# Patient Record
Sex: FEMALE | Race: WHITE | NOT HISPANIC OR LATINO | Employment: FULL TIME | URBAN - METROPOLITAN AREA
[De-identification: names, ages, dates, MRNs, and addresses within clinical notes are randomized per-mention and may not be internally consistent; named-entity substitution may affect disease eponyms.]

---

## 2022-12-23 ENCOUNTER — HOSPITAL ENCOUNTER (EMERGENCY)
Facility: HOSPITAL | Age: 57
Discharge: HOME | End: 2022-12-23
Attending: EMERGENCY MEDICINE

## 2022-12-23 ENCOUNTER — APPOINTMENT (EMERGENCY)
Dept: CT IMAGING | Facility: HOSPITAL | Age: 57
End: 2022-12-23

## 2022-12-23 VITALS
RESPIRATION RATE: 22 BRPM | HEIGHT: 68 IN | SYSTOLIC BLOOD PRESSURE: 102 MMHG | DIASTOLIC BLOOD PRESSURE: 72 MMHG | OXYGEN SATURATION: 100 % | HEART RATE: 79 BPM | BODY MASS INDEX: 21.22 KG/M2 | TEMPERATURE: 98.1 F | WEIGHT: 140 LBS

## 2022-12-23 DIAGNOSIS — R20.2 PARESTHESIAS: ICD-10-CM

## 2022-12-23 DIAGNOSIS — G43.909 MIGRAINE: Primary | ICD-10-CM

## 2022-12-23 LAB
ALBUMIN SERPL BCP-MCNC: 3.8 G/DL (ref 3.5–5)
ALP SERPL-CCNC: 68 U/L (ref 46–116)
ALT SERPL W P-5'-P-CCNC: 40 U/L (ref 12–78)
ANION GAP SERPL CALCULATED.3IONS-SCNC: 8 MMOL/L (ref 4–13)
APTT PPP: 29 SECONDS (ref 23–37)
AST SERPL W P-5'-P-CCNC: 26 U/L (ref 5–45)
ATRIAL RATE: 69 BPM
BASOPHILS # BLD AUTO: 0.08 THOUSANDS/ÂΜL (ref 0–0.1)
BASOPHILS NFR BLD AUTO: 1 % (ref 0–1)
BILIRUB SERPL-MCNC: 0.41 MG/DL (ref 0.2–1)
BUN SERPL-MCNC: 14 MG/DL (ref 5–25)
CALCIUM SERPL-MCNC: 9.3 MG/DL (ref 8.3–10.1)
CARDIAC TROPONIN I PNL SERPL HS: <2 NG/L
CARDIAC TROPONIN I PNL SERPL HS: <2 NG/L
CHLORIDE SERPL-SCNC: 106 MMOL/L (ref 96–108)
CO2 SERPL-SCNC: 27 MMOL/L (ref 21–32)
CREAT SERPL-MCNC: 0.78 MG/DL (ref 0.6–1.3)
EOSINOPHIL # BLD AUTO: 0.24 THOUSAND/ÂΜL (ref 0–0.61)
EOSINOPHIL NFR BLD AUTO: 4 % (ref 0–6)
ERYTHROCYTE [DISTWIDTH] IN BLOOD BY AUTOMATED COUNT: 12.2 % (ref 11.6–15.1)
GFR SERPL CREATININE-BSD FRML MDRD: 85 ML/MIN/1.73SQ M
GLUCOSE SERPL-MCNC: 92 MG/DL (ref 65–140)
HCT VFR BLD AUTO: 41.5 % (ref 34.8–46.1)
HGB BLD-MCNC: 13.9 G/DL (ref 11.5–15.4)
IMM GRANULOCYTES # BLD AUTO: 0.01 THOUSAND/UL (ref 0–0.2)
IMM GRANULOCYTES NFR BLD AUTO: 0 % (ref 0–2)
INR PPP: 0.92 (ref 0.84–1.19)
LYMPHOCYTES # BLD AUTO: 1.81 THOUSANDS/ÂΜL (ref 0.6–4.47)
LYMPHOCYTES NFR BLD AUTO: 30 % (ref 14–44)
MCH RBC QN AUTO: 32.2 PG (ref 26.8–34.3)
MCHC RBC AUTO-ENTMCNC: 33.5 G/DL (ref 31.4–37.4)
MCV RBC AUTO: 96 FL (ref 82–98)
MONOCYTES # BLD AUTO: 0.48 THOUSAND/ÂΜL (ref 0.17–1.22)
MONOCYTES NFR BLD AUTO: 8 % (ref 4–12)
NEUTROPHILS # BLD AUTO: 3.41 THOUSANDS/ÂΜL (ref 1.85–7.62)
NEUTS SEG NFR BLD AUTO: 57 % (ref 43–75)
NRBC BLD AUTO-RTO: 0 /100 WBCS
P AXIS: 74 DEGREES
PLATELET # BLD AUTO: 315 THOUSANDS/UL (ref 149–390)
PMV BLD AUTO: 10.1 FL (ref 8.9–12.7)
POTASSIUM SERPL-SCNC: 5.1 MMOL/L (ref 3.5–5.3)
PR INTERVAL: 150 MS
PROT SERPL-MCNC: 7.4 G/DL (ref 6.4–8.4)
PROTHROMBIN TIME: 12.2 SECONDS (ref 11.6–14.5)
QRS AXIS: 80 DEGREES
QRSD INTERVAL: 78 MS
QT INTERVAL: 410 MS
QTC INTERVAL: 439 MS
RBC # BLD AUTO: 4.32 MILLION/UL (ref 3.81–5.12)
SODIUM SERPL-SCNC: 141 MMOL/L (ref 135–147)
T WAVE AXIS: 75 DEGREES
VENTRICULAR RATE: 69 BPM
WBC # BLD AUTO: 6.03 THOUSAND/UL (ref 4.31–10.16)

## 2022-12-23 RX ORDER — METOCLOPRAMIDE HYDROCHLORIDE 5 MG/ML
10 INJECTION INTRAMUSCULAR; INTRAVENOUS ONCE
Status: COMPLETED | OUTPATIENT
Start: 2022-12-23 | End: 2022-12-23

## 2022-12-23 RX ORDER — DIPHENHYDRAMINE HYDROCHLORIDE 50 MG/ML
25 INJECTION INTRAMUSCULAR; INTRAVENOUS ONCE
Status: COMPLETED | OUTPATIENT
Start: 2022-12-23 | End: 2022-12-23

## 2022-12-23 RX ADMIN — DIPHENHYDRAMINE HYDROCHLORIDE 25 MG: 50 INJECTION, SOLUTION INTRAMUSCULAR; INTRAVENOUS at 11:12

## 2022-12-23 RX ADMIN — SODIUM CHLORIDE 1000 ML: 0.9 INJECTION, SOLUTION INTRAVENOUS at 11:09

## 2022-12-23 RX ADMIN — METOCLOPRAMIDE 10 MG: 5 INJECTION, SOLUTION INTRAMUSCULAR; INTRAVENOUS at 11:14

## 2022-12-23 RX ADMIN — IOHEXOL 85 ML: 350 INJECTION, SOLUTION INTRAVENOUS at 12:30

## 2022-12-23 NOTE — ED NOTES
[facility] arranging transport for pt to return to their facility        Dilma Dunaway RN  12/23/22 8906
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
## 2022-12-25 ENCOUNTER — HOSPITAL ENCOUNTER (EMERGENCY)
Facility: HOSPITAL | Age: 57
Discharge: HOME | End: 2022-12-25
Attending: EMERGENCY MEDICINE

## 2022-12-25 VITALS
HEART RATE: 76 BPM | TEMPERATURE: 97.5 F | OXYGEN SATURATION: 100 % | SYSTOLIC BLOOD PRESSURE: 95 MMHG | RESPIRATION RATE: 17 BRPM | DIASTOLIC BLOOD PRESSURE: 56 MMHG

## 2022-12-25 DIAGNOSIS — M79.606 LEG PAIN: ICD-10-CM

## 2022-12-25 DIAGNOSIS — R51.9 HEADACHE: Primary | ICD-10-CM

## 2022-12-25 LAB — D DIMER PPP FEU-MCNC: <0.27 UG/ML FEU

## 2022-12-25 RX ORDER — DIPHENHYDRAMINE HYDROCHLORIDE 50 MG/ML
25 INJECTION INTRAMUSCULAR; INTRAVENOUS ONCE
Status: COMPLETED | OUTPATIENT
Start: 2022-12-25 | End: 2022-12-25

## 2022-12-25 RX ORDER — ACETAMINOPHEN 325 MG/1
975 TABLET ORAL ONCE
Status: COMPLETED | OUTPATIENT
Start: 2022-12-25 | End: 2022-12-25

## 2022-12-25 RX ORDER — DEXAMETHASONE SODIUM PHOSPHATE 10 MG/ML
10 INJECTION, SOLUTION INTRAMUSCULAR; INTRAVENOUS ONCE
Status: COMPLETED | OUTPATIENT
Start: 2022-12-25 | End: 2022-12-25

## 2022-12-25 RX ORDER — METOCLOPRAMIDE HYDROCHLORIDE 5 MG/ML
10 INJECTION INTRAMUSCULAR; INTRAVENOUS ONCE
Status: COMPLETED | OUTPATIENT
Start: 2022-12-25 | End: 2022-12-25

## 2022-12-25 RX ORDER — MAGNESIUM SULFATE HEPTAHYDRATE 40 MG/ML
2 INJECTION, SOLUTION INTRAVENOUS ONCE
Status: COMPLETED | OUTPATIENT
Start: 2022-12-25 | End: 2022-12-25

## 2022-12-25 RX ADMIN — DIPHENHYDRAMINE HYDROCHLORIDE 25 MG: 50 INJECTION, SOLUTION INTRAMUSCULAR; INTRAVENOUS at 14:44

## 2022-12-25 RX ADMIN — MAGNESIUM SULFATE HEPTAHYDRATE 2 G: 40 INJECTION, SOLUTION INTRAVENOUS at 14:52

## 2022-12-25 RX ADMIN — ACETAMINOPHEN 975 MG: 325 TABLET, FILM COATED ORAL at 14:35

## 2022-12-25 RX ADMIN — METOCLOPRAMIDE 10 MG: 5 INJECTION, SOLUTION INTRAMUSCULAR; INTRAVENOUS at 14:44

## 2022-12-25 RX ADMIN — DEXAMETHASONE SODIUM PHOSPHATE 10 MG: 10 INJECTION, SOLUTION INTRAMUSCULAR; INTRAVENOUS at 14:44

## 2022-12-25 RX ADMIN — SODIUM CHLORIDE 1000 ML: 0.9 INJECTION, SOLUTION INTRAVENOUS at 14:43

## 2022-12-25 NOTE — ED PROVIDER NOTES
History  Chief Complaint   Patient presents with   • Headache     Pt reports a headache for over 1 week  Was seen here Friday for the headache but it came back  Also pain in left thigh  Patient is a 16-year-old female past medical history of anxiety, depression, migraine presenting with headache and leg pain  Patient states that she has had frontal headache which radiates to the top of her head and has been constant for the past 2 days  Was seen here for 2 days ago and states it resolved but has noticed the meds were off when she went home and came back  States that it is constant, throbbing in nature, but denies any photophobia, photophobia or nausea or vomiting  Denies any head or neck injuries  Does note intermittently blurred vision  States that she took ibuprofen this morning with some relief  She also notes left thigh pain which she states has been going on for roughly 1 week, denies any injuries  States that it is to the left inner thigh and is not related to movement, nonradiating but denies any swelling  She does take estrogen but denies any other history of blood clots or coagulopathies, cancer diagnoses, surgeries, recent injuries or trauma, recent immobilization  None       Past Medical History:   Diagnosis Date   • Addiction [facility])    • Anxiety    • Depression    • Hypotension    • Pneumothorax    • Skull fracture ([facility] [address] )        History reviewed  No pertinent surgical history  History reviewed  No pertinent family history  I have reviewed and agree with the history as documented      E-Cigarette/Vaping   • E-Cigarette Use Current Some Day User      E-Cigarette/Vaping Substances   • Nicotine Yes    • THC No    • CBD No    • Flavoring Yes    • Other No    • Unknown No      Social History     Tobacco Use   • Smoking status: Every Day     Packs/day: 0 50     Types: Cigarettes   • Smokeless tobacco: Never   Vaping Use   • Vaping Use: Some days   • Substances: Nicotine, Flavoring Substance Use Topics   • Alcohol use: Not Currently     Comment: in recovery   • Drug use: Not Currently     Comment: used cocaine       Review of Systems   All other systems reviewed and are negative  Physical Exam  Physical Exam  Vitals reviewed  Constitutional:       General: She is not in acute distress  Appearance: Normal appearance  She is not ill-appearing  HENT:      Mouth/Throat:      Mouth: Mucous membranes are moist    Eyes:      Extraocular Movements: Extraocular movements intact  Conjunctiva/sclera: Conjunctivae normal       Pupils: Pupils are equal, round, and reactive to light  Cardiovascular:      Rate and Rhythm: Normal rate  Pulmonary:      Effort: Pulmonary effort is normal    Abdominal:      General: Abdomen is flat  Palpations: Abdomen is soft  Tenderness: There is no abdominal tenderness  Musculoskeletal:         General: Tenderness present  No swelling  Normal range of motion  Cervical back: Neck supple  Right lower leg: No edema  Left lower leg: No edema  Comments: Mild tenderness to the left medial thigh without erythema, edema, increased warmth   Skin:     General: Skin is warm and dry  Neurological:      General: No focal deficit present  Mental Status: She is alert  Cranial Nerves: No cranial nerve deficit  Sensory: No sensory deficit  Motor: No weakness        Coordination: Coordination normal       Gait: Gait normal    Psychiatric:         Mood and Affect: Mood normal          Vital Signs  ED Triage Vitals   Temperature Pulse Respirations Blood Pressure SpO2   12/25/22 1406 12/25/22 1406 12/25/22 1406 12/25/22 1406 12/25/22 1406   97 5 °F (36 4 °C) 90 19 130/78 100 %      Temp Source Heart Rate Source Patient Position - Orthostatic VS BP Location FiO2 (%)   12/25/22 1406 12/25/22 1406 12/25/22 1406 12/25/22 1406 --   Temporal Monitor Sitting Left arm       Pain Score       12/25/22 1435       7 Vitals:    12/25/22 1406 12/25/22 1500   BP: 130/78 106/70   Pulse: 90 75   Patient Position - Orthostatic VS: Sitting          Visual Acuity      ED Medications  Medications   sodium chloride 0 9 % bolus 1,000 mL (1,000 mL Intravenous New Bag 12/25/22 1443)   magnesium sulfate 2 g/50 mL IVPB (premix) 2 g (2 g Intravenous New Bag 12/25/22 1452)   metoclopramide (REGLAN) injection 10 mg (10 mg Intravenous Given 12/25/22 1444)   diphenhydrAMINE (BENADRYL) injection 25 mg (25 mg Intravenous Given 12/25/22 1444)   acetaminophen (TYLENOL) tablet 975 mg (975 mg Oral Given 12/25/22 1435)   dexamethasone (PF) (DECADRON) injection 10 mg (10 mg Intravenous Given 12/25/22 1444)       Diagnostic Studies  Results Reviewed     Procedure Component Value Units Date/Time    D-Dimer [[id number]]  (Normal) Collected: 12/25/22 1442    Lab Status: Final result Specimen: Blood from Arm, Right Updated: 12/25/22 1526     D-Dimer, Quant <0 27 ug/ml FEU     Narrative: In the evaluation for possible pulmonary embolism, in the appropriate (Well's Score of 4 or less) patient, the age adjusted d-dimer cutoff for this patient can be calculated as:    Age x 0 01 (in ug/mL) for Age-adjusted D-dimer exclusion threshold for a patient over 50 years  No orders to display              Procedures  Procedures         ED Course  ED Course as of 12/25/22 1534   Sun Dec 25, 2022   1428 Patient advised of incidental findings of thyroid nodule on prior CT   1531 Dimer negative, have discussed return precautions and outpatient follow-up and patient states she understands  Headache is fully resolved  MDM  Number of Diagnoses or Management Options  Diagnosis management comments: Patient is a 21-year-old female past medical history of anxiety, depression, migraine presenting with headache, leg pain  Patient is well-appearing at bedside with stable vitals and in no acute distress    She has no gross timeout is on neurologic exam is ambulatory bedside with steady gait  Had CT CTA that was -2 days ago  Will give migraine cocktail, with Decadron to prevent recurrence and have continue to advise follow-up with neurology however do not feel the patient requires reimaging at this time  As patient does note left inner thigh pain and states that she is concerned for blood clot however only risk factor for blood clot is estrogen use patient is Wells criteria moderate risk and will obtain D-dimer  If positive will send for outpatient ultrasound as I do not have vascular ultrasound available at this time and will give dose of Lovenox  Disposition  Final diagnoses:   Headache   Leg pain     Time reflects when diagnosis was documented in both MDM as applicable and the Disposition within this note     Time User Action Codes Description Comment    12/25/2022  3:34 PM Lazara Brooks [R51 9] Headache     12/25/2022  3:34 PM Lazara Aguirre Add [M79 [id number]] Leg pain       ED Disposition     ED Disposition   Discharge    Condition   Stable    Date/Time   Sun Dec 25, 2022  3:34 PM    Comment   Keith Orellana discharge to home/self care  Follow-up Information     Follow up With Specialties Details Why Contact Info Additional Information    [address] [facility] Emergency Department Emergency Medicine  If symptoms worsen [address] Emergency Department, [address] [place] [address], [id number]    Your PCP  Schedule an appointment as soon as possible for a visit              Patient's Medications    No medications on file       No discharge procedures on file      PDMP Review     None          ED Provider  Electronically Signed by           Shilo Valles DO  12/25/22 0594

## 2023-01-16 ENCOUNTER — APPOINTMENT (EMERGENCY)
Dept: RADIOLOGY | Facility: HOSPITAL | Age: 58
End: 2023-01-16

## 2023-01-16 ENCOUNTER — HOSPITAL ENCOUNTER (EMERGENCY)
Facility: HOSPITAL | Age: 58
Discharge: HOME | End: 2023-01-16
Attending: EMERGENCY MEDICINE

## 2023-01-16 VITALS
DIASTOLIC BLOOD PRESSURE: 60 MMHG | HEART RATE: 74 BPM | RESPIRATION RATE: 20 BRPM | TEMPERATURE: 98.6 F | SYSTOLIC BLOOD PRESSURE: 125 MMHG | OXYGEN SATURATION: 100 %

## 2023-01-16 DIAGNOSIS — U07.1 COVID-19: Primary | ICD-10-CM

## 2023-01-16 NOTE — ED PROVIDER NOTES
History  Chief Complaint   Patient presents with   • Cough     Pt states COVID+ yesterday, cough, congestion  65yo female with a history of depression presenting for evaluation of flu-like symptoms  She started to develop a cold about 3 days ago with cough, congestion, and malaise  No fevers  Home COVID test was positive yesterday  She started to feel short of breath earlier today so decided to come to the ED  No chest pain  She smokes 1/2 ppd  She is currently residing at [facility] sober living and multiple other residents are also sick  She has been sober from cocaine and alcohol for about 50 days  History provided by:  Patient   used: No    Cough  Cough characteristics:  Productive  Sputum characteristics:  Green  Severity:  Moderate  Onset quality:  Gradual  Duration:  3 days  Timing:  Constant  Progression:  Unchanged  Chronicity:  New  Smoker: yes    Associated symptoms: shortness of breath    Associated symptoms: no chest pain, no chills, no eye discharge, no fever and no rash        None       Past Medical History:   Diagnosis Date   • Addiction ([facility] [address] )    • Anxiety    • Depression    • Hypotension    • Pneumothorax    • Skull fracture ([facility])        No past surgical history on file  No family history on file  I have reviewed and agree with the history as documented  E-Cigarette/Vaping   • E-Cigarette Use Current Some Day User      E-Cigarette/Vaping Substances   • Nicotine Yes    • THC No    • CBD No    • Flavoring Yes    • Other No    • Unknown No      Social History     Tobacco Use   • Smoking status: Every Day     Packs/day: 0 50     Types: Cigarettes   • Smokeless tobacco: Never   Vaping Use   • Vaping Use: Some days   • Substances: Nicotine, Flavoring   Substance Use Topics   • Alcohol use: Not Currently     Comment: in recovery   • Drug use: Not Currently     Comment: used cocaine       Review of Systems   Constitutional: Negative for chills and fever     HENT: Positive for congestion  Negative for drooling  Eyes: Negative for discharge and redness  Respiratory: Positive for cough and shortness of breath  Cardiovascular: Negative for chest pain  Gastrointestinal: Negative for diarrhea and vomiting  Musculoskeletal: Negative for neck pain and neck stiffness  Skin: Negative for color change and rash  Psychiatric/Behavioral: Negative for confusion  The patient is not nervous/anxious  All other systems reviewed and are negative  Physical Exam  Physical Exam  Vitals and nursing note reviewed  Constitutional:       General: She is not in acute distress  Appearance: She is well-developed  She is not diaphoretic  HENT:      Head: Normocephalic and atraumatic  Right Ear: External ear normal       Left Ear: External ear normal       Nose: Nose normal    Eyes:      General: No scleral icterus  Right eye: No discharge  Left eye: No discharge  Conjunctiva/sclera: Conjunctivae normal    Cardiovascular:      Rate and Rhythm: Normal rate and regular rhythm  Heart sounds: Normal heart sounds  No murmur heard  Pulmonary:      Effort: Pulmonary effort is normal  No respiratory distress  Breath sounds: Normal breath sounds  No stridor  No wheezing or rales  Comments: Lungs clear  Respirations unlabored  Speaking in full sentences without difficulty  Musculoskeletal:         General: No deformity  Normal range of motion  Cervical back: Normal range of motion and neck supple  Lymphadenopathy:      Cervical: No cervical adenopathy  Skin:     General: Skin is warm and dry  Neurological:      Mental Status: She is alert  She is not disoriented  GCS: GCS eye subscore is 4  GCS verbal subscore is 5  GCS motor subscore is 6     Psychiatric:         Behavior: Behavior normal          Vital Signs  ED Triage Vitals [01/16/23 1212]   Temperature Pulse Respirations Blood Pressure SpO2   98 6 °F (37 °C) 74 20 125/60 100 %      Temp src Heart Rate Source Patient Position - Orthostatic VS BP Location FiO2 (%)   -- Monitor Sitting Left arm --      Pain Score       --           Vitals:    01/16/23 1212   BP: 125/60   Pulse: 74   Patient Position - Orthostatic VS: Sitting         Visual Acuity      ED Medications  Medications - No data to display    Diagnostic Studies  Results Reviewed     None                 XR chest 1 view portable   ED Interpretation by Froilan Chowdhury PA-C (01/16 1306)   No acute consolidation      Final Result by Gin Whitley MD (01/16 1327)      No acute cardiopulmonary disease  Workstation performed: [id number]                    Procedures  Procedures         ED Course                   Medical Decision Making  57yoF presenting for flu-like symptoms x 3 day  Home COVID test positive  C/o cough, congestion, dyspnea  Oxygen saturation 100% on room air  Remainder of vitals normal and she is afebrile  Patient is well appearing in no distress  Lungs are clear and work of breathing is normal     CXR obtained which appears normal without acute consolidations  No indication for further workup  Supportive care and quarantine discussed  Advised close PCP follow-up  ED return precautions discussed  Patient expressed understanding and is agreeable to plan  Patient discharged in stable condition  COVID-19: acute illness or injury  Amount and/or Complexity of Data Reviewed  Radiology: ordered and independent interpretation performed  Disposition  Final diagnoses:   [id number]     Time reflects when diagnosis was documented in both MDM as applicable and the Disposition within this note     Time User Action Codes Description Comment    1/16/2023  1:10 PM [address] [place] [address] Kamala [U07 1] COVID-19       ED Disposition     ED Disposition   Discharge    Condition   Stable    Date/Time   Mon Jan 16, 2023  1:10 PM    Doris Bates discharge to home/self care  Follow-up Information     Follow up With Specialties Details Why Contact Info Additional Information    [address] [facility] Emergency Department Emergency Medicine  If symptoms worsen 34 [facility] Emergency Department, [address] [place] [address], [facility]          There are no discharge medications for this patient  No discharge procedures on file      PDMP Review     None          ED Provider  Electronically Signed by           Amos Phillips PA-C  01/16/23 0744

## 2023-01-16 NOTE — DISCHARGE INSTRUCTIONS
Remain in quarantine for 5 days  On day 6, you may leave quarantine if you are fever free for 24 hours and feeling improved  You must wear a mask until day 10  Drink plenty of fluids and rest  Take Tylenol as needed for aches/fevers  Take a multivitamin daily  Please follow-up with your family doctor  Return to the ER with any worsening symptoms

## 2023-01-24 ENCOUNTER — APPOINTMENT (EMERGENCY)
Dept: VASCULAR ULTRASOUND | Facility: HOSPITAL | Age: 58
End: 2023-01-24

## 2023-01-24 ENCOUNTER — HOSPITAL ENCOUNTER (EMERGENCY)
Facility: HOSPITAL | Age: 58
Discharge: HOME | End: 2023-01-24
Attending: EMERGENCY MEDICINE | Admitting: EMERGENCY MEDICINE

## 2023-01-24 VITALS
RESPIRATION RATE: 18 BRPM | HEART RATE: 85 BPM | DIASTOLIC BLOOD PRESSURE: 62 MMHG | TEMPERATURE: 98 F | SYSTOLIC BLOOD PRESSURE: 152 MMHG | OXYGEN SATURATION: 98 %

## 2023-01-24 DIAGNOSIS — M79.606 LEG PAIN: ICD-10-CM

## 2023-01-24 DIAGNOSIS — R20.2 PARESTHESIAS: Primary | ICD-10-CM

## 2023-01-24 LAB
ALBUMIN SERPL BCP-MCNC: 3.7 G/DL (ref 3.5–5)
ALP SERPL-CCNC: 69 U/L (ref 46–116)
ALT SERPL W P-5'-P-CCNC: 52 U/L (ref 12–78)
ANION GAP SERPL CALCULATED.3IONS-SCNC: 7 MMOL/L (ref 4–13)
AST SERPL W P-5'-P-CCNC: 22 U/L (ref 5–45)
BASOPHILS # BLD AUTO: 0.07 THOUSANDS/ÂΜL (ref 0–0.1)
BASOPHILS NFR BLD AUTO: 1 % (ref 0–1)
BILIRUB SERPL-MCNC: 0.28 MG/DL (ref 0.2–1)
BUN SERPL-MCNC: 14 MG/DL (ref 5–25)
CALCIUM SERPL-MCNC: 9.3 MG/DL (ref 8.3–10.1)
CHLORIDE SERPL-SCNC: 106 MMOL/L (ref 96–108)
CO2 SERPL-SCNC: 30 MMOL/L (ref 21–32)
CREAT SERPL-MCNC: 0.82 MG/DL (ref 0.6–1.3)
EOSINOPHIL # BLD AUTO: 0.29 THOUSAND/ÂΜL (ref 0–0.61)
EOSINOPHIL NFR BLD AUTO: 5 % (ref 0–6)
ERYTHROCYTE [DISTWIDTH] IN BLOOD BY AUTOMATED COUNT: 12 % (ref 11.6–15.1)
GFR SERPL CREATININE-BSD FRML MDRD: 80 ML/MIN/1.73SQ M
GLUCOSE SERPL-MCNC: 78 MG/DL (ref 65–140)
HCT VFR BLD AUTO: 37.9 % (ref 34.8–46.1)
HGB BLD-MCNC: 12.7 G/DL (ref 11.5–15.4)
IMM GRANULOCYTES # BLD AUTO: 0.02 THOUSAND/UL (ref 0–0.2)
IMM GRANULOCYTES NFR BLD AUTO: 0 % (ref 0–2)
LYMPHOCYTES # BLD AUTO: 1.85 THOUSANDS/ÂΜL (ref 0.6–4.47)
LYMPHOCYTES NFR BLD AUTO: 31 % (ref 14–44)
MCH RBC QN AUTO: 31.3 PG (ref 26.8–34.3)
MCHC RBC AUTO-ENTMCNC: 33.5 G/DL (ref 31.4–37.4)
MCV RBC AUTO: 93 FL (ref 82–98)
MONOCYTES # BLD AUTO: 0.58 THOUSAND/ÂΜL (ref 0.17–1.22)
MONOCYTES NFR BLD AUTO: 10 % (ref 4–12)
NEUTROPHILS # BLD AUTO: 3.22 THOUSANDS/ÂΜL (ref 1.85–7.62)
NEUTS SEG NFR BLD AUTO: 53 % (ref 43–75)
NRBC BLD AUTO-RTO: 0 /100 WBCS
PLATELET # BLD AUTO: 354 THOUSANDS/UL (ref 149–390)
PMV BLD AUTO: 8.8 FL (ref 8.9–12.7)
POTASSIUM SERPL-SCNC: 4.2 MMOL/L (ref 3.5–5.3)
PROT SERPL-MCNC: 7 G/DL (ref 6.4–8.4)
RBC # BLD AUTO: 4.06 MILLION/UL (ref 3.81–5.12)
SODIUM SERPL-SCNC: 143 MMOL/L (ref 135–147)
WBC # BLD AUTO: 6.03 THOUSAND/UL (ref 4.31–10.16)

## 2023-01-24 RX ADMIN — SODIUM CHLORIDE 1000 ML: 0.9 INJECTION, SOLUTION INTRAVENOUS at 13:35

## 2023-01-24 NOTE — ED PROVIDER NOTES
History  Chief Complaint   Patient presents with   • Leg Pain     Pt states she has B/L leg weakness, numbness and "pinching under the skin" since December      Jose Toney is a 36-year-old female with past medical history including anxiety and depression, prior history of alcohol abuse, cocaine abuse, and tobacco use, currently in remission, presenting to the emergency department for evaluation with chief complaint of left leg pain  Patient states that this had developed several weeks ago with no inciting event  When she was previously evaluated in this emergency department on 12/25/2022 she had mentioned this while being evaluated, and a D-dimer was ordered during that visit which was within normal limits  Patient reports that she was previously taking exogenous estrogen which she has since stopped  She describes a tingling sensation throughout the leg, and reports that the pain is along the left thigh  She denies any known injuries or trauma  She states she is still concerned for DVT given her prior risk factors  She notes that symptoms seem to now involve the right lower extremity as well  She denies any extremity weakness on my assessment, in contrast with the triage note  She denies any back pain, abdominal pain, uti symptoms  She denies chest pain or shortness of breath, palpitations, diaphoresis, fevers, chills, sweats, n/v/d  She offers no other complaints or concerns at this time  None       Past Medical History:   Diagnosis Date   • Addiction [facility])    • Anxiety    • Depression    • Hypotension    • Pneumothorax    • Skull fracture ([facility] [address] )        History reviewed  No pertinent surgical history  History reviewed  No pertinent family history  I have reviewed and agree with the history as documented      E-Cigarette/Vaping   • E-Cigarette Use Current Some Day User      E-Cigarette/Vaping Substances   • Nicotine Yes    • THC No    • CBD No    • Flavoring Yes    • Other No    • Unknown No Social History     Tobacco Use   • Smoking status: Former     Packs/day: 0 50     Types: Cigarettes   • Smokeless tobacco: Never   Vaping Use   • Vaping Use: Some days   • Substances: Nicotine, Flavoring   Substance Use Topics   • Alcohol use: Not Currently     Comment: in recovery   • Drug use: Not Currently     Comment: used cocaine       Review of Systems   Constitutional: Negative for appetite change, chills, fatigue and fever  Gastrointestinal: Negative for nausea and vomiting  Musculoskeletal:        Left leg pain   Skin: Negative for rash  Neurological: Positive for numbness  Negative for weakness and headaches  Psychiatric/Behavioral: The patient is nervous/anxious  All other systems reviewed and are negative  Physical Exam  Physical Exam  Vitals and nursing note reviewed  Constitutional:       General: She is not in acute distress  Appearance: Normal appearance  She is not ill-appearing, toxic-appearing or diaphoretic  HENT:      Head: Normocephalic and atraumatic  Right Ear: External ear normal       Left Ear: External ear normal    Eyes:      Conjunctiva/sclera: Conjunctivae normal    Cardiovascular:      Rate and Rhythm: Normal rate  Pulmonary:      Effort: Pulmonary effort is normal    Musculoskeletal:         General: Normal range of motion  Cervical back: Normal range of motion and neck supple  Comments: No spinous process tenderness to palpation of the lumbar spine, point tenderness, bony deformity or step-offs  No obvious abnormality on inspection of either lower extremity  Symmetric strength of both lower extremities  Patient able to range all joints of the left lower extremity without difficulty  Calves soft, nontender, nonedematous, with no palpable cords  Distal sensation intact upon palpation of both lower extremities  Distal pulses intact  Skin:     General: Skin is warm and dry        Capillary Refill: Capillary refill takes less than 2 seconds  Neurological:      Mental Status: She is alert  Mental status is at baseline  Sensory: Sensation is intact  No sensory deficit  Motor: Motor function is intact  No weakness or abnormal muscle tone  Gait: Gait is intact           Vital Signs  ED Triage Vitals [01/24/23 1220]   Temperature Pulse Respirations Blood Pressure SpO2   98 °F (36 7 °C) 85 18 152/62 98 %      Temp Source Heart Rate Source Patient Position - Orthostatic VS BP Location FiO2 (%)   Oral Monitor Sitting Left arm --      Pain Score       7           Vitals:    01/24/23 1220   BP: 152/62   Pulse: 85   Patient Position - Orthostatic VS: Sitting         Visual Acuity      ED Medications  Medications   sodium chloride 0 9 % bolus 1,000 mL (0 mL Intravenous Stopped 1/24/23 1431)       Diagnostic Studies  Results Reviewed     Procedure Component Value Units Date/Time    Comprehensive metabolic panel [[id number]] Collected: 01/24/23 1334    Lab Status: Final result Specimen: Blood from Arm, Left Updated: 01/24/23 1359     Sodium 143 mmol/L      Potassium 4 2 mmol/L      Chloride 106 mmol/L      CO2 30 mmol/L      ANION GAP 7 mmol/L      BUN 14 mg/dL      Creatinine 0 82 mg/dL      Glucose 78 mg/dL      Calcium 9 3 mg/dL      AST 22 U/L      ALT 52 U/L      Alkaline Phosphatase 69 U/L      Total Protein 7 0 g/dL      Albumin 3 7 g/dL      Total Bilirubin 0 28 mg/dL      eGFR 80 ml/min/1 73sq m     Narrative:      [place] guidelines for Chronic Kidney Disease (CKD):   •  Stage 1 with normal or high GFR (GFR > 90 mL/min/1 73 square meters)  •  Stage 2 Mild CKD (GFR = 60-89 mL/min/1 73 square meters)  •  Stage 3A Moderate CKD (GFR = 45-59 mL/min/1 73 square meters)  •  Stage 3B Moderate CKD (GFR = 30-44 mL/min/1 73 square meters)  •  Stage 4 Severe CKD (GFR = 15-29 mL/min/1 73 square meters)  •  Stage 5 End Stage CKD (GFR <15 mL/min/1 73 square meters)  Note: GFR calculation is accurate only with a steady state creatinine    CBC and differential [[id number]]  (Abnormal) Collected: 01/24/23 1334    Lab Status: Final result Specimen: Blood from Arm, Left Updated: 01/24/23 1342     WBC 6 03 Thousand/uL      RBC 4 06 Million/uL      Hemoglobin 12 7 g/dL      Hematocrit 37 9 %      MCV 93 fL      MCH 31 3 pg      MCHC 33 5 g/dL      RDW 12 0 %      MPV 8 8 fL      Platelets 758 Thousands/uL      nRBC 0 /100 WBCs      Neutrophils Relative 53 %      Immat GRANS % 0 %      Lymphocytes Relative 31 %      Monocytes Relative 10 %      Eosinophils Relative 5 %      Basophils Relative 1 %      Neutrophils Absolute 3 22 Thousands/µL      Immature Grans Absolute 0 02 Thousand/uL      Lymphocytes Absolute 1 85 Thousands/µL      Monocytes Absolute 0 58 Thousand/µL      Eosinophils Absolute 0 29 Thousand/µL      Basophils Absolute 0 07 Thousands/µL                  VAS lower limb venous duplex study, unilateral/limited   Final Result by Sotero Chandra MD (01/25 1247)                 Procedures  Procedures         ED Course  ED Course as of 01/25/23 1648   Tue Jan 24, 2023   1431 Duplex negative for dvt per US technician Kaylee Aldrich                               SBIRT 20yo+    Flowsheet Row Most Recent Value   SBIRT (23 yo +)    In order to provide better care to our patients, we are screening all of our patients for alcohol and drug use  Would it be okay to ask you these screening questions? Yes Filed at: 01/24/2023 1255   Initial Alcohol Screen: US AUDIT-C     1  How often do you have a drink containing alcohol? 0 Filed at: 01/24/2023 1255   2  How many drinks containing alcohol do you have on a typical day you are drinking? 0 Filed at: 01/24/2023 1255   3a  Male UNDER 65: How often do you have five or more drinks on one occasion? 0 Filed at: 01/24/2023 1255   3b  FEMALE Any Age, or MALE 65+: How often do you have 4 or more drinks on one occassion?  0 Filed at: 01/24/2023 1255   Audit-C Score 0 Filed at: 01/24/2023 1255   ITZEL: How many times in the past year have you    Used an illegal drug or used a prescription medication for non-medical reasons? Never Filed at: 01/24/2023 1254                    Medical Decision Making  This is a 61-year-old female with prior history of cocaine/etoh abuse, former tobacco user, and prior exogenous estrogen use presenting for evaluation with persistent left lower extremity pain, stating that her main concern is for DVT  She was evaluated in this emergency department 1 month ago at which time she was experiencing the same symptoms, and a d dimer was obtained and this was within normal limits  She has no chest pain or shortness of breath  She states that she has a sensation of pins-and-needles throughout both lower extremities  She is neurovascularly intact on exam     Differential diagnosis includes but is not limited to: paresthesias, neuropathy, dvt, osteoarthritis, bursitis, electrolyte derangements, anemia    Initial ED plan: labs, duplex    Final ED Assessment: Vital signs reviewed on ED presentation, examination as above  All labs and imaging independently reviewed with imaging interpreted by the Radiologist   Labs are within normal limits, and duplex negative for DVT  Test results reviewed with patient at bedside  She reports significant relief in knowing that this ultrasound is negative  Recommended outpatient internal medicine and orthopedic follow-up for further evaluation if symptoms continue, and parameters for ED return discussed at length  Patient verbalized understanding and was comfortable and agreeable with disposition and care plan  She was discharged in stable condition and and ambulated independently from the emergency department today without issue  Leg pain: acute illness or injury  Paresthesias: acute illness or injury  Amount and/or Complexity of Data Reviewed  Labs: ordered  ECG/medicine tests: ordered            Disposition  Final diagnoses:   Paresthesias   Leg pain Time reflects when diagnosis was documented in both MDM as applicable and the Disposition within this note     Time User Action Codes Description Comment    1/24/2023  2:32 PM Gely Napoles Add [R20 2] Paresthesias     1/24/2023  2:33 PM Gely Napoles Add [I77 334] Leg pain       ED Disposition     ED Disposition   Discharge    Condition   Stable    Date/Time   Tue Jan 24, 2023  2:32 PM    Comment   Reg Blanika discharge to home/self care  Follow-up Information     Follow up With Specialties Details Why Contact Info Additional Information    [facility] Internal Medicine [facility]   [id number] [facility] [id number]  [address] [facility] Internal Medicine [facility], [address] [place] [address] [address], [facility] [address], [place], [address] Specialists [facility] Orthopedic Surgery   [facility] [address]  [address] [facility] [address]  [address] [facility], [address] [place] [address] [address], [facility] [address] [facility] [address], [place], [place], [address], [address] Emergency Department Emergency Medicine  If symptoms worsen [address] Emergency Department, [address] [place], [place], [address]          There are no discharge medications for this patient  No discharge procedures on file      PDMP Review     None          ED Provider  Electronically Signed by           Terrell Isaac PA-C  01/25/23 2344

## 2024-01-16 PROBLEM — Z00.00 ENCOUNTER FOR PREVENTIVE HEALTH EXAMINATION: Status: ACTIVE | Noted: 2024-01-16

## 2024-02-06 ENCOUNTER — APPOINTMENT (OUTPATIENT)
Dept: NEUROLOGY | Facility: CLINIC | Age: 59
End: 2024-02-06
Payer: MEDICAID

## 2024-02-06 VITALS
SYSTOLIC BLOOD PRESSURE: 103 MMHG | BODY MASS INDEX: 21.98 KG/M2 | HEIGHT: 68 IN | WEIGHT: 145 LBS | OXYGEN SATURATION: 99 % | DIASTOLIC BLOOD PRESSURE: 69 MMHG | HEART RATE: 65 BPM

## 2024-02-06 DIAGNOSIS — E53.8 DEFICIENCY OF OTHER SPECIFIED B GROUP VITAMINS: ICD-10-CM

## 2024-02-06 DIAGNOSIS — Z80.0 FAMILY HISTORY OF MALIGNANT NEOPLASM OF DIGESTIVE ORGANS: ICD-10-CM

## 2024-02-06 DIAGNOSIS — Z78.9 OTHER SPECIFIED HEALTH STATUS: ICD-10-CM

## 2024-02-06 DIAGNOSIS — S02.92XA UNSPECIFIED FRACTURE OF SKULL, INITIAL ENCOUNTER FOR CLOSED FRACTURE: ICD-10-CM

## 2024-02-06 DIAGNOSIS — E55.9 VITAMIN D DEFICIENCY, UNSPECIFIED: ICD-10-CM

## 2024-02-06 DIAGNOSIS — Z87.891 PERSONAL HISTORY OF NICOTINE DEPENDENCE: ICD-10-CM

## 2024-02-06 DIAGNOSIS — G51.0 BELL'S PALSY: ICD-10-CM

## 2024-02-06 DIAGNOSIS — Z87.19 PERSONAL HISTORY OF OTHER DISEASES OF THE DIGESTIVE SYSTEM: ICD-10-CM

## 2024-02-06 DIAGNOSIS — G43.109 MIGRAINE WITH AURA, NOT INTRACTABLE, W/OUT STATUS MIGRAINOSUS: ICD-10-CM

## 2024-02-06 DIAGNOSIS — S02.91XA UNSPECIFIED FRACTURE OF SKULL, INITIAL ENCOUNTER FOR CLOSED FRACTURE: ICD-10-CM

## 2024-02-06 DIAGNOSIS — Z63.4 DISAPPEARANCE AND DEATH OF FAMILY MEMBER: ICD-10-CM
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
## 2024-02-06 PROCEDURE — 99203 OFFICE O/P NEW LOW 30 MIN: CPT

## 2024-02-06 RX ORDER — CITALOPRAM HYDROBROMIDE 10 MG/1
10 TABLET, FILM COATED ORAL
Refills: 0 | Status: ACTIVE | COMMUNITY

## 2024-02-06 RX ORDER — UBIDECARENONE/VIT E ACET 100MG-5
1000 CAPSULE ORAL
Refills: 0 | Status: ACTIVE | COMMUNITY

## 2024-02-06 RX ORDER — FEZOLINETANT 45 MG/1
45 TABLET, FILM COATED ORAL
Refills: 0 | Status: ACTIVE | COMMUNITY

## 2024-02-06 RX ORDER — ALPRAZOLAM 0.5 MG/1
0.5 TABLET ORAL
Refills: 0 | Status: ACTIVE | COMMUNITY

## 2024-02-06 RX ORDER — OMEPRAZOLE 40 MG/1
40 CAPSULE, DELAYED RELEASE ORAL
Refills: 0 | Status: ACTIVE | COMMUNITY

## 2024-02-06 RX ORDER — CITALOPRAM HYDROBROMIDE 10 MG/5ML
10 SOLUTION ORAL
Refills: 0 | Status: ACTIVE | COMMUNITY

## 2024-02-06 RX ORDER — PNV NO.95/FERROUS FUM/FOLIC AC 28MG-0.8MG
100 TABLET ORAL
Refills: 0 | Status: ACTIVE | COMMUNITY

## 2024-02-06 RX ORDER — ALPRAZOLAM 1 MG/1
1 TABLET ORAL
Refills: 0 | Status: ACTIVE | COMMUNITY

## 2024-02-06 SDOH — SOCIAL STABILITY - SOCIAL INSECURITY: DISSAPEARANCE AND DEATH OF FAMILY MEMBER: Z63.4

## 2024-02-06 NOTE — DATA REVIEWED
[FreeTextEntry1] : MRI C-spine dated September 28, 2016: minimal multilevel degenerative disc disease with bilateral neural foraminal narrowing at the levels of C4-5, C5-6 and C6-7. No evidence of nerve root or cord compression. Prominent eventual epidural veins are noted at the levels of C4 through C7.  MRI brain without dated January 25, 2022: no acute infarct, hemorrhage or mass effect.  MRA head dated September 3, 2016: the major vasculature of the [place] of will is is grossly patent. There is no aneurysm greater than 3 mm. There is no arteriovenous malformation. 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  EEG awaken drowsy Sept 6, 2016: normal EEG in the awake and brief drowsy states. Background consists of low-voltage irregular 9- 10 Hz alpha activity.
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
## 2024-02-06 NOTE — REASON FOR VISIT
[Initial Eval - Existing Diagnosis] : an initial evaluation of an existing diagnosis [FreeTextEntry1] : Migraine

## 2025-03-17 ENCOUNTER — NON-APPOINTMENT (OUTPATIENT)
Age: 60
End: 2025-03-17

## 2025-07-07 ENCOUNTER — RESULT REVIEW (OUTPATIENT)
Age: 60
End: 2025-07-07